# Patient Record
Sex: FEMALE | Race: WHITE | URBAN - METROPOLITAN AREA
[De-identification: names, ages, dates, MRNs, and addresses within clinical notes are randomized per-mention and may not be internally consistent; named-entity substitution may affect disease eponyms.]

---

## 2018-02-14 ENCOUNTER — APPOINTMENT (OUTPATIENT)
Dept: URBAN - METROPOLITAN AREA SURGERY 9 | Age: 83
Setting detail: DERMATOLOGY
End: 2018-02-17

## 2018-02-14 VITALS — HEART RATE: 64 BPM | SYSTOLIC BLOOD PRESSURE: 130 MMHG | RESPIRATION RATE: 16 BRPM | DIASTOLIC BLOOD PRESSURE: 80 MMHG

## 2018-02-14 DIAGNOSIS — L57.8 OTHER SKIN CHANGES DUE TO CHRONIC EXPOSURE TO NONIONIZING RADIATION: ICD-10-CM

## 2018-02-14 PROBLEM — Z85.828 PERSONAL HISTORY OF OTHER MALIGNANT NEOPLASM OF SKIN: Status: ACTIVE | Noted: 2018-02-14

## 2018-02-14 PROBLEM — E78.5 HYPERLIPIDEMIA, UNSPECIFIED: Status: ACTIVE | Noted: 2018-02-14

## 2018-02-14 PROBLEM — I10 ESSENTIAL (PRIMARY) HYPERTENSION: Status: ACTIVE | Noted: 2018-02-14

## 2018-02-14 PROBLEM — M12.9 ARTHROPATHY, UNSPECIFIED: Status: ACTIVE | Noted: 2018-02-14

## 2018-02-14 PROBLEM — C44.311 BASAL CELL CARCINOMA OF SKIN OF NOSE: Status: ACTIVE | Noted: 2018-02-14

## 2018-02-14 PROCEDURE — 17312 MOHS ADDL STAGE: CPT

## 2018-02-14 PROCEDURE — OTHER MOHS SURGERY: OTHER

## 2018-02-14 PROCEDURE — 17311 MOHS 1 STAGE H/N/HF/G: CPT

## 2018-02-14 PROCEDURE — 99202 OFFICE O/P NEW SF 15 MIN: CPT | Mod: 25

## 2018-02-14 PROCEDURE — OTHER REASSURANCE: OTHER

## 2018-02-14 PROCEDURE — OTHER COUNSELING: OTHER

## 2018-02-14 PROCEDURE — OTHER CONSULTATION FOR MOHS SURGERY: OTHER

## 2018-02-14 PROCEDURE — 13152 CMPLX RPR E/N/E/L 2.6-7.5 CM: CPT

## 2018-02-14 PROCEDURE — OTHER RETURN TO REFERRING PROVIDER: OTHER

## 2018-02-14 ASSESSMENT — LOCATION ZONE DERM: LOCATION ZONE: FACE

## 2018-02-14 ASSESSMENT — LOCATION SIMPLE DESCRIPTION DERM: LOCATION SIMPLE: LEFT CHEEK

## 2018-02-14 ASSESSMENT — LOCATION DETAILED DESCRIPTION DERM: LOCATION DETAILED: LEFT INFERIOR CENTRAL MALAR CHEEK

## 2018-02-14 NOTE — PROCEDURE: CONSULTATION FOR MOHS SURGERY
Body Location Override (Optional - Billing Will Still Be Based On Selected Body Map Location If Applicable): nasal tip
X Size Of Lesion In Cm (Optional): 0
Detail Level: Detailed
Incorporate Mauc In Note: Yes

## 2019-09-27 NOTE — PROCEDURE: MOHS SURGERY
Implemented All Universal Safety Interventions:  [place] to call system. Call bell, personal items and telephone within reach. Instruct patient to call for assistance. Room bathroom lighting operational. Non-slip footwear when patient is off stretcher. Physically safe environment: no spills, clutter or unnecessary equipment. Stretcher in lowest position, wheels locked, appropriate side rails in place. Composite Graft Text: The defect edges were debeveled with a #15 scalpel blade.  Given the location of the defect, shape of the defect, the proximity to free margins and the fact the defect was full thickness a composite graft was deemed most appropriate.  The defect was outline and then transferred to the donor site.  A full thickness graft was then excised from the donor site. The graft was then placed in the primary defect, oriented appropriately and then sutured into place.  The secondary defect was then repaired using a primary closure.

## 2024-10-03 NOTE — PROCEDURE: MOHS SURGERY
[facility]        Subjective   Patient seen sitting up in bed. Family at bedside. Blood culture no growth so far. Afebrile. Tolerating antibiotics.    Review of Systems   Constitutional:  Negative for appetite change, fatigue, fever and unexpected weight change.   HENT:  Negative for facial swelling, hearing loss, mouth sores, rhinorrhea, sinus pain, sore throat and voice change.    Eyes:  Negative for photophobia, redness and visual disturbance.   Respiratory:  Negative for cough, shortness of breath and wheezing.    Cardiovascular:  Positive for leg swelling. Negative for chest pain.   Gastrointestinal:  Negative for abdominal distention, abdominal pain, blood in stool, diarrhea, nausea and vomiting.   Endocrine: Negative for heat intolerance and polyuria.   Genitourinary:  Negative for difficulty urinating, flank pain, genital sores and urgency.   Musculoskeletal:  Positive for myalgias. Negative for back pain, joint swelling and neck stiffness.   Skin:  Positive for color change and wound. Negative for rash.   Allergic/Immunologic: Negative for immunocompromised state.   Neurological:  Negative for seizures, speech difficulty, weakness and headaches.   Hematological:  Negative for adenopathy.   Psychiatric/Behavioral:  Negative for confusion. The patient is not nervous/anxious.         Objective       Last Recorded Vitals  Blood pressure (!) 145/89, pulse 64, temperature 98.1 °F (36.7 °C), temperature source Oral, resp. rate 18, height 5' 6\" (1.676 m), weight 66.8 kg (147 lb 4.3 oz), SpO2 99%.  Body mass index is 23.77 kg/m².    Physical Exam  Vitals and nursing note reviewed.   Constitutional:       General: She is not in acute distress.     Appearance: She is well-developed. She is not diaphoretic.   HENT:      Head: Normocephalic and atraumatic.   Eyes:      General: No scleral  icterus.     Conjunctiva/sclera: Conjunctivae normal.   Cardiovascular:      Rate and Rhythm: Normal rate and regular rhythm.      Heart sounds: Normal heart sounds.   Pulmonary:      Effort: Pulmonary effort is normal. No respiratory distress.      Breath sounds: Normal breath sounds. No wheezing.   Abdominal:      General: Bowel sounds are normal.      Palpations: Abdomen is soft.      Tenderness: There is no abdominal tenderness.   Musculoskeletal:         General: Tenderness present. Normal range of motion.      Cervical back: Neck supple.      Left lower leg: Edema present.      Comments: Left medial ankle surgical wound with partial dehiscence, surrounding necrotic scab, surrounding erythema and tenderness   Lymphadenopathy:      Cervical: No cervical adenopathy.   Skin:     General: Skin is warm and dry.      Findings: Erythema present. No rash.   Neurological:      Mental Status: She is alert and oriented to person, place, and time.   Psychiatric:         Behavior: Behavior normal.         Thought Content: Thought content normal.         Judgment: Judgment normal.            Current Facility-Administered Medications   Medication    metroNIDAZOLE (FLAGYL) tablet 500 mg    amLODIPine (NORVASC) tablet 10 mg    DULoxetine (CYMBALTA) capsule 60 mg    metoPROLOL succinate (TOPROL-XL) ER tablet 50 mg    sodium chloride 0.9% infusion    folic acid (FOLATE) tablet 1 mg    thiamine (VITAMIN B1) tablet 100 mg    LORazepam (ATIVAN) tablet 2 mg    Or    LORazepam (ATIVAN) tablet 3 mg    Or    LORazepam (ATIVAN) tablet 4 mg    Or    LORazepam (ATIVAN) injection 2 mg    Or    LORazepam (ATIVAN) injection 3 mg    Or    LORazepam (ATIVAN) injection 4 mg    VANCOMYCIN - PHARMACIST MONITORED Misc    HYDROcodone-acetaminophen (NORCO) 5-325 MG per tablet 1 tablet    cefTRIAXone (ROCEPHIN) syringe 2,000 mg    gabapentin (NEURONTIN) capsule 400 mg    vancomycin (VANCOCIN) 750 mg in sodium chloride 0.9 % 250 mL IVPB        Labs      Admission on 10/01/2024   Component Date Value Ref Range Status    Sodium 10/01/2024 134 (L)  135 - 145 mmol/L Final    Potassium 10/01/2024 4.7  3.4 - 5.1 mmol/L Final    Slight to moderate hemolysis, result may be falsely increased.    Chloride 10/01/2024 109  97 - 110 mmol/L Final    Carbon Dioxide 10/01/2024 17 (L)  21 - 32 mmol/L Final    Anion Gap 10/01/2024 13  7 - 19 mmol/L Final    Glucose 10/01/2024 84  70 - 99 mg/dL Final    BUN 10/01/2024 19  6 - 20 mg/dL Final    Creatinine 10/01/2024 1.45 (H)  0.51 - 0.95 mg/dL Final    Glomerular Filtration Rate 10/01/2024 44 (L)  >=60 Final    eGFR 30-59 mL/min/1.73m2 = Moderate decrease in kidney function. Stage 3 CKD (chronic kidney disease) or moderate kidney disease. Estimated GFR calculated using the CKD-EPI-R (2021) equation that does not include race in the creatinine calculation.    BUN/Cr 10/01/2024 13  7 - 25 Final    Calcium 10/01/2024 10.1  8.4 - 10.2 mg/dL Final    Bilirubin, Total 10/01/2024 0.5  0.2 - 1.0 mg/dL Final    GOT/AST 10/01/2024 55 (H)  <=37 Units/L Final    Slight to moderate hemolysis, result may be falsely increased.    GPT/ALT 10/01/2024 41  <64 Units/L Final    Alkaline Phosphatase 10/01/2024 126 (H)  45 - 117 Units/L Final    Albumin 10/01/2024 4.6  3.4 - 5.0 g/dL Final    Protein, Total 10/01/2024 9.4 (H)  6.4 - 8.2 g/dL Final    Globulin 10/01/2024 4.8 (H)  2.0 - 4.0 g/dL Final    A/G Ratio 10/01/2024 1.0  1.0 - 2.4 Final    Protime- PT 10/01/2024 10.6  9.7 - 11.8 sec Final    INR 10/01/2024 1.0    Final    INR Therapeutic Range: 2.0 to 3.0 (2.5 to 3.5 recommended for recurrent thrombotic episodes and mechanical prosthetic heart valves.)    PTT 10/01/2024 23  22 - 32 sec Final    Lactate, Venous 10/01/2024 1.9  0.0 - 2.0 mmol/L Final    Culture, Blood or Bone Marrow 10/01/2024 No Growth 1 Day.   Preliminary    Culture, Blood or Bone Marrow 10/01/2024 No Growth 1 Day.   Preliminary    WBC 10/01/2024 11.0  4.2 - 11.0 K/mcL Final     RBC 10/01/2024 4.95  4.00 - 5.20 mil/mcL Final    HGB 10/01/2024 18.8 (H)  12.0 - 15.5 g/dL Final    HCT 10/01/2024 51.9 (H)  36.0 - 46.5 % Final    MCV 10/01/2024 104.8 (H)  78.0 - 100.0 fl Final    MCH 10/01/2024 38.0 (H)  26.0 - 34.0 pg Final    MCHC 10/01/2024 36.2  32.0 - 36.5 g/dL Final    RDW-CV 10/01/2024 13.0  11.0 - 15.0 % Final    RDW-SD 10/01/2024 50.5 (H)  39.0 - 50.0 fL Final    PLT 10/01/2024 274  140 - 450 K/mcL Final    NRBC 10/01/2024 0  <=0 /100 WBC Final    Neutrophil, Percent 10/01/2024 82  % Final    Lymphocytes, Percent 10/01/2024 10  % Final    Mono, Percent 10/01/2024 5  % Final    Eosinophils, Percent 10/01/2024 1  % Final    Basophils, Percent 10/01/2024 1  % Final    Immature Granulocytes 10/01/2024 1  % Final    Absolute Neutrophils 10/01/2024 9.1 (H)  1.8 - 7.7 K/mcL Final    Absolute Lymphocytes 10/01/2024 1.1  1.0 - 4.8 K/mcL Final    Absolute Monocytes 10/01/2024 0.6  0.3 - 0.9 K/mcL Final    Absolute Eosinophils  10/01/2024 0.1  0.0 - 0.5 K/mcL Final    Absolute Basophils 10/01/2024 0.1  0.0 - 0.3 K/mcL Final    Absolute Immature Granulocytes 10/01/2024 0.1  0.0 - 0.2 K/mcL Final    Sodium 10/02/2024 137  135 - 145 mmol/L Final    Potassium 10/02/2024 3.6  3.4 - 5.1 mmol/L Final    Chloride 10/02/2024 112 (H)  97 - 110 mmol/L Final    Carbon Dioxide 10/02/2024 13 (L)  21 - 32 mmol/L Final    Anion Gap 10/02/2024 16  7 - 19 mmol/L Final    Glucose 10/02/2024 80  70 - 99 mg/dL Final    BUN 10/02/2024 19  6 - 20 mg/dL Final    Creatinine 10/02/2024 1.36 (H)  0.51 - 0.95 mg/dL Final    Glomerular Filtration Rate 10/02/2024 48 (L)  >=60 Final    eGFR 30-59 mL/min/1.73m2 = Moderate decrease in kidney function. Stage 3 CKD (chronic kidney disease) or moderate kidney disease. Estimated GFR calculated using the CKD-EPI-R (2021) equation that does not include race in the creatinine calculation.    BUN/Cr 10/02/2024 14  7 - 25 Final    Calcium 10/02/2024 9.5  8.4 - 10.2 mg/dL Final     Bilirubin, Total 10/02/2024 0.4  0.2 - 1.0 mg/dL Final    GOT/AST 10/02/2024 34  <=37 Units/L Final    GPT/ALT 10/02/2024 36  <64 Units/L Final    Alkaline Phosphatase 10/02/2024 114  45 - 117 Units/L Final    Albumin 10/02/2024 3.9  3.4 - 5.0 g/dL Final    Protein, Total 10/02/2024 7.8  6.4 - 8.2 g/dL Final    Globulin 10/02/2024 3.9  2.0 - 4.0 g/dL Final    A/G Ratio 10/02/2024 1.0  1.0 - 2.4 Final    WBC 10/02/2024 7.9  4.2 - 11.0 K/mcL Final    RBC 10/02/2024 4.46  4.00 - 5.20 mil/mcL Final    HGB 10/02/2024 16.9 (H)  12.0 - 15.5 g/dL Final    HCT 10/02/2024 47.8 (H)  36.0 - 46.5 % Final    MCV 10/02/2024 107.2 (H)  78.0 - 100.0 fl Final    MCH 10/02/2024 37.9 (H)  26.0 - 34.0 pg Final    MCHC 10/02/2024 35.4  32.0 - 36.5 g/dL Final    RDW-CV 10/02/2024 12.8  11.0 - 15.0 % Final    RDW-SD 10/02/2024 51.3 (H)  39.0 - 50.0 fL Final    PLT 10/02/2024 262  140 - 450 K/mcL Final    NRBC 10/02/2024 0  <=0 /100 WBC Final    Neutrophil, Percent 10/02/2024 65  % Final    Lymphocytes, Percent 10/02/2024 21  % Final    Mono, Percent 10/02/2024 11  % Final    Eosinophils, Percent 10/02/2024 2  % Final    Basophils, Percent 10/02/2024 1  % Final    Immature Granulocytes 10/02/2024 0  % Final    Absolute Neutrophils 10/02/2024 5.1  1.8 - 7.7 K/mcL Final    Absolute Lymphocytes 10/02/2024 1.6  1.0 - 4.8 K/mcL Final    Absolute Monocytes 10/02/2024 0.9  0.3 - 0.9 K/mcL Final    Absolute Eosinophils  10/02/2024 0.2  0.0 - 0.5 K/mcL Final    Absolute Basophils 10/02/2024 0.1  0.0 - 0.3 K/mcL Final    Absolute Immature Granulocytes 10/02/2024 0.0  0.0 - 0.2 K/mcL Final    Sodium 10/02/2024 136  135 - 145 mmol/L Final    Potassium 10/02/2024 4.0  3.4 - 5.1 mmol/L Final    Chloride 10/02/2024 109  97 - 110 mmol/L Final    Carbon Dioxide 10/02/2024 18 (L)  21 - 32 mmol/L Final    Anion Gap 10/02/2024 13  7 - 19 mmol/L Final    Glucose 10/02/2024 122 (H)  70 - 99 mg/dL Final    BUN 10/02/2024 16  6 - 20 mg/dL Final    Creatinine  10/02/2024 1.34 (H)  0.51 - 0.95 mg/dL Final    Glomerular Filtration Rate 10/02/2024 49 (L)  >=60 Final    eGFR 30-59 mL/min/1.73m2 = Moderate decrease in kidney function. Stage 3 CKD (chronic kidney disease) or moderate kidney disease. Estimated GFR calculated using the CKD-EPI-R (2021) equation that does not include race in the creatinine calculation.    BUN/Cr 10/02/2024 12  7 - 25 Final    Calcium 10/02/2024 9.6  8.4 - 10.2 mg/dL Final    Bilirubin, Total 10/02/2024 0.4  0.2 - 1.0 mg/dL Final    GOT/AST 10/02/2024 43 (H)  <=37 Units/L Final    GPT/ALT 10/02/2024 42  <64 Units/L Final    Alkaline Phosphatase 10/02/2024 117  45 - 117 Units/L Final    Albumin 10/02/2024 4.0  3.4 - 5.0 g/dL Final    Protein, Total 10/02/2024 8.1  6.4 - 8.2 g/dL Final    Globulin 10/02/2024 4.1 (H)  2.0 - 4.0 g/dL Final    A/G Ratio 10/02/2024 1.0  1.0 - 2.4 Final    WBC 10/02/2024 5.9  4.2 - 11.0 K/mcL Final    RBC 10/02/2024 4.66  4.00 - 5.20 mil/mcL Final    HGB 10/02/2024 17.8 (H)  12.0 - 15.5 g/dL Final    HCT 10/02/2024 50.7 (H)  36.0 - 46.5 % Final    MCV 10/02/2024 108.8 (H)  78.0 - 100.0 fl Final    MCH 10/02/2024 38.2 (H)  26.0 - 34.0 pg Final    MCHC 10/02/2024 35.1  32.0 - 36.5 g/dL Final    RDW-CV 10/02/2024 12.8  11.0 - 15.0 % Final    RDW-SD 10/02/2024 51.8 (H)  39.0 - 50.0 fL Final    PLT 10/02/2024 291  140 - 450 K/mcL Final    NRBC 10/02/2024 0  <=0 /100 WBC Final    Neutrophil, Percent 10/02/2024 69  % Final    Lymphocytes, Percent 10/02/2024 18  % Final    Mono, Percent 10/02/2024 9  % Final    Eosinophils, Percent 10/02/2024 2  % Final    Basophils, Percent 10/02/2024 2  % Final    Immature Granulocytes 10/02/2024 0  % Final    Absolute Neutrophils 10/02/2024 4.1  1.8 - 7.7 K/mcL Final    Absolute Lymphocytes 10/02/2024 1.1  1.0 - 4.8 K/mcL Final    Absolute Monocytes 10/02/2024 0.5  0.3 - 0.9 K/mcL Final    Absolute Eosinophils  10/02/2024 0.1  0.0 - 0.5 K/mcL Final    Absolute Basophils 10/02/2024 0.1  0.0 -  0.3 K/mcL Final    Absolute Immature Granulocytes 10/02/2024 0.0  0.0 - 0.2 K/mcL Final       Culture: Reviewed    Imaging  No results found.       Assessment & Plan   ----Partial wound dehiscence s/p left lower extremity tarsal tunnel release surgery  ----Postop wound infection     Plan:  ----Continue ceftriaxone and vancomycin. Flagyl added for anaerobic coverage.  ----Collect culture from wound drainage, will follow to adjust antibiotics accordingly  --- Blood cultures have also been sent, negative so far  ----Wound care on consult.  ----We will continue to follow. Discussed with patient, RN, and .    Ceftriaxone 10/1  Vancomycin 10/1  Flagyl 10/2           NANCY WAN   Rhombic Flap Text: The defect edges were debeveled with a #15 scalpel blade.  Given the location of the defect and the proximity to free margins a rhombic flap was deemed most appropriate.  Using a sterile surgical marker, an appropriate rhombic flap was drawn incorporating the defect.    The area thus outlined was incised deep to adipose tissue with a #15 scalpel blade.  The skin margins were undermined to an appropriate distance in all directions utilizing iris scissors.

## 2024-10-15 NOTE — PROCEDURE: MOHS SURGERY
Detail Level: Detailed Lesion Type: Cyst Method: 11 blade Curette: Yes Include Sutures?: No Anesthesia Type: 1% lidocaine with epinephrine Anesthesia Volume In Cc: 2 Size Of Lesion In Cm (Optional But May Be Required For Some Insurances): 0 Wound Care: Petrolatum Dressing: dry sterile dressing Epidermal Sutures: 3-0 Ethilon Epidermal Closure: diagonal mattress Suture Text: The incision was closed with multiple sutures. Patient was monitored for 15 minutes with no evidence of bleeding or swelling. Preparation Text: The area was prepped in the usual clean fashion. Consent was obtained and risks were reviewed including but not limited to delayed wound healing, infection, need for multiple I and D's, and pain. Post-Care Instructions: I reviewed with the patient in detail post-care instructions. Patient should keep wound covered and call the office should any redness, pain, swelling or worsening occur. A-T Advancement Flap Text: The defect edges were debeveled with a #15 scalpel blade.  Given the location of the defect, shape of the defect and the proximity to free margins an A-T advancement flap was deemed most appropriate.  Using a sterile surgical marker, an appropriate advancement flap was drawn incorporating the defect and placing the expected incisions within the relaxed skin tension lines where possible.    The area thus outlined was incised deep to adipose tissue with a #15 scalpel blade.  The skin margins were undermined to an appropriate distance in all directions utilizing iris scissors.